# Patient Record
Sex: FEMALE | Race: WHITE
[De-identification: names, ages, dates, MRNs, and addresses within clinical notes are randomized per-mention and may not be internally consistent; named-entity substitution may affect disease eponyms.]

---

## 2018-03-05 ENCOUNTER — HOSPITAL ENCOUNTER (EMERGENCY)
Dept: HOSPITAL 11 - JP.ED | Age: 83
Discharge: ANOTHER HOSPITAL | End: 2018-03-05
Payer: MEDICARE

## 2018-03-05 DIAGNOSIS — Z88.8: ICD-10-CM

## 2018-03-05 DIAGNOSIS — E78.00: ICD-10-CM

## 2018-03-05 DIAGNOSIS — Z79.899: ICD-10-CM

## 2018-03-05 DIAGNOSIS — Z79.82: ICD-10-CM

## 2018-03-05 DIAGNOSIS — K52.9: Primary | ICD-10-CM

## 2018-03-05 DIAGNOSIS — K62.5: ICD-10-CM

## 2018-03-05 PROCEDURE — 36415 COLL VENOUS BLD VENIPUNCTURE: CPT

## 2018-03-05 PROCEDURE — 80048 BASIC METABOLIC PNL TOTAL CA: CPT

## 2018-03-05 PROCEDURE — 85027 COMPLETE CBC AUTOMATED: CPT

## 2018-03-05 PROCEDURE — 99285 EMERGENCY DEPT VISIT HI MDM: CPT

## 2018-03-05 NOTE — EDM.PDOC
ED HPI GENERAL MEDICAL PROBLEM





- General


Chief Complaint: Gastrointestinal Problem


Stated Complaint: MEDICAL VIA NORTH


Time Seen by Provider: 03/05/18 19:27


Source of Information: Reports: Patient, Family (Daughter), Old Records, RN 

Notes Reviewed


History Limitations: Reports: No Limitations





- History of Present Illness


INITIAL COMMENTS - FREE TEXT/NARRATIVE: 





EMS arrival





Chief complaint


Diarrhea, weakness





History of present illness


89-year-old female who lives on her own apartment, talks to her daughter daily


Was well earlier today but at 2 PM started developing diarrhea,


no abdominal pain no nausea no vomiting no cramping


several episodes then about 5 PM she went to lay down, but felt so well she 

went back to the bathroom, incontinent of stool, and then continue to pass a 

large amount of diarrhea in the toilet. Towards the end there was some bright 

red bleeding with some clots.


She went to stand up and felt very weak and then that herself down to the 

floor. When she laid down she actually bumped her personal alarm, EMS was 

notified as was feels a bit better now, not quite as weak.


No abdominal pain


No fever


No sick contacts like this





History of ulcerative colitis and has had some bleeding in the remote past.


She stopped her ulcerative clivus medication a year ago with insurance no 

longer covered it. Her last colonoscopy was 6 years ago and she was told she 

didn't need to have it anymore.





No history of peptic ulcer


Does not use any NSAIDs apart from 81 mg of aspirin daily


  ** denies


Pain Score (Numeric/FACES): 0





- Related Data


 Allergies











Allergy/AdvReac Type Severity Reaction Status Date / Time


 


niacin Allergy  Rash Verified 03/05/18 18:42


 


pravastatin Allergy  Leg Cramps Verified 03/05/18 18:42


 


simvastatin Allergy  Leg Cramps Verified 03/05/18 18:42











Home Meds: 


 Home Meds





Aspirin 81 mg PO DAILY 12/07/13 [History]


Calcium Carbonate/Vitamin D3 [Calcium 600 + Vit D Tablet] 1 tab PO DAILY 12/07/ 13 [History]


Carvedilol 3.125 mg PO DAILY 12/07/13 [History]


Glucosamine/D3/Boswellia Janice [Glucosamine Complex Tablet] 1 tab PO DAILY 12/07 /13 [History]


Multivitamin [Multivitamins] 1 tab PO DAILY 12/07/13 [History]


Omega-3/DHA/Epa/Fish Oil [Fish Oil 1,000 mg Softgel] 1 cap PO DAILY 12/07/13 [

History]


Omeprazole 40 mg PO DAILY PRN 12/07/13 [History]


Acyclovir [Zovirax] 1 tab PO BID 03/05/18 [History]


Brinzolamide/Brimonidine Tart [Simbrinza 1%-0.2% Eye Drops] 1 drop TOP BEDTIME 

03/05/18 [History]


Travoprost [Travatan Z] 1 drop TOP DAILY 03/05/18 [History]


prednisoLONE Acetate [Prednisolone Acetate] 1 drop TOP DAILY 03/05/18 [History]











Past Medical History


HEENT History: Reports: Glaucoma, Impaired Vision


Cardiovascular History: Reports: High Cholesterol, Stents


Gastrointestinal History: Reports: Other (See Below)


Other Gastrointestinal History: hx of ulcerative cholitis


OB/GYN History: Reports: Pregnancy


Musculoskeletal History: Reports: Fracture, Osteoarthritis


Other Musculoskeletal History: r ankle fx


Hematologic History: Reports: Blood Transfusion(s)


Oncologic (Cancer) History: Reports: Other (See Below)


Other Oncologic History: skin





- Infectious Disease History


Infectious Disease History: Reports: Chicken Pox, Measles, Mumps, Other (See 

Below)





- Past Surgical History


HEENT Surgical History: Reports: Cataract Surgery


GI Surgical History: Reports: Colonoscopy


Dermatological Surgical History: Reports: Skin Biopsy, Other (See Below)





Social & Family History





- Tobacco Use


Smoking Status *Q: Never Smoker


Second Hand Smoke Exposure: No





- Caffeine Use


Caffeine Use: Reports: Coffee, Soda, Tea





- Alcohol Use


Days Per Week of Alcohol Use: 0


Number of Drinks Per Day: 1


Total Drinks Per Week: 0





- Recreational Drug Use


Recreational Drug Use: No





ED ROS GENERAL





- Review of Systems


Review Of Systems: See Below


Constitutional: Reports: Malaise, Weakness.  Denies: Fever, Diaphoresis, 

Decreased Appetite


HEENT: Reports: No Symptoms


Respiratory: Reports: No Symptoms


Cardiovascular: Reports: No Symptoms


Endocrine: Reports: No Symptoms


GI/Abdominal: Reports: Bloody Stool, Diarrhea.  Denies: Abdominal Pain, Black 

Stool, Constipation, Hematemesis, Melena, Nausea, Vomiting


: Reports: No Symptoms


Musculoskeletal: Reports: No Symptoms


Skin: Reports: Other (Recent treatment for skin lesions on her face)


Neurological: Reports: Weakness (Lightheaded at home)


Psychiatric: Reports: No Symptoms


Immunologic: Reports: No Symptoms





ED EXAM, GI/ABD





- Physical Exam


Exam: See Below


Exam Limited By: No Limitations


General Appearance: Alert, Mild Distress, Other (Looks well, elevated blood 

pressure other vital signs normal, no difficulty speaking or breathing)


Eyes: Bilateral: Normal Appearance, EOMI


Ears: Normal External Exam, Hearing Grossly Normal


Nose: Normal Inspection


Throat/Mouth: Normal Voice, Other (Dry mouth and tongue)


Head: Atraumatic


Neck: Normal Inspection, Supple.  No: Lymphadenopathy (R), Lymphadenopathy (L)


Respiratory/Chest: No Respiratory Distress, Lungs Clear, No Accessory Muscle Use


Cardiovascular: Normal Peripheral Pulses, Regular Rate, Rhythm


GI/Abdominal Exam: Normal Bowel Sounds, Soft, Non-Tender.  No: Guarding, Rigid, 

Tender


Rectal (Female) Exam: Other (2 small external skin tags, one small internal 

fissure right at the anal margin, no active bleeding currently)


Extremities: Normal Inspection, No Pedal Edema


Neurological: Alert, Oriented, Normal Cognition, No Motor/Sensory Deficits


Psychiatric: Normal Mood


Skin Exam: Warm, Dry, Intact, Normal Color, Other (2 abraded areas on her face 

from recent lesion treatment)





Course





- Vital Signs


Last Recorded V/S: 


 Last Vital Signs











Temp  36.3 C   03/05/18 18:42


 


Pulse  77   03/05/18 21:16


 


Resp  16   03/05/18 18:42


 


BP  153/83 H  03/05/18 21:16


 


Pulse Ox  94 L  03/05/18 21:16














- Orders/Labs/Meds


Orders: 


 Active Orders 24 hr











 Category Date Time Status


 


 Peripheral IV Care [RC] .AS DIRECTED Care  03/05/18 19:28 Active


 


 Sodium Chloride 0.9% [Normal Saline] 1,000 ml Med  03/05/18 19:30 Active





 IV ASDIRECTED   


 


 Peripheral IV Insertion Adult [OM.PC] Routine Oth  03/05/18 19:28 Ordered








 Medication Orders





Sodium Chloride (Normal Saline)  1,000 mls @ 500 mls/hr IV ASDIRECTED [facility]


   Last Admin: 03/05/18 19:45  Dose: 500 mls/hr








Labs: 


 Laboratory Tests











  03/05/18 03/05/18 Range/Units





  19:40 19:40 


 


WBC  8.9   (4.5-11.0)  K/uL


 


RBC  3.86   (3.30-5.50)  M/uL


 


Hgb  12.3   (12.0-15.0)  g/dL


 


Hct  37.5   (36.0-48.0)  %


 


MCV  97   (80-98)  fL


 


MCH  32 H   (27-31)  pg


 


MCHC  33   (32-36)  %


 


Plt Count  262   (150-400)  K/uL


 


Sodium   142  (140-148)  mmol/L


 


Potassium   5.2  (3.6-5.2)  mmol/L


 


Chloride   107  (100-108)  mmol/L


 


Carbon Dioxide   26  (21-32)  mmol/L


 


Anion Gap   9.4  (5.0-14.0)  mmol/L


 


BUN   19 H  (7-18)  mg/dL


 


Creatinine   0.8  (0.6-1.0)  mg/dL


 


Est Cr Clr Drug Dosing   37.71  mL/min


 


Estimated GFR (MDRD)   > 60  (>60)  


 


Glucose   151 H  ()  mg/dL


 


Calcium   8.6  (8.5-10.1)  mg/dL











Meds: 


Medications











Generic Name Dose Route Start Last Admin





  Trade Name Freq  PRN Reason Stop Dose Admin


 


Sodium Chloride  1,000 mls @ 500 mls/hr  03/05/18 19:30  03/05/18 19:45





  Normal Saline  IV   500 mls/hr





  ASDIRECTED [facility]   Administration














- Re-Assessments/Exams


Free Text/Narrative Re-Assessment/Exam: 





03/05/18 19:34


89-year-old female with several episodes of diarrhea becoming more intense 

after 5 PM, associated with some lightheadedness and weakness which has 

improved.


No abdominal pain.


Some rectal bleeding which clotted.





History of ulcerative colitis





Most likely gastroenteritis with bleeding coming from either internal 

hemorrhoids or local proctitis. Unlikely to be due to ulcerative colitis.





Intravenous saline and labs ordered


Not in any abdominal pain or nausea so no medications required at this time


03/05/18 20:45





Feeling well


CBC BMP essentially normal apart from mild elevation BUN


No diarrhea since arrival


03/05/18 21:37





Remained stable, has received a liter of IV saline, stable for discharge home





Departure





- Departure


Time of Disposition: 21:37


Disposition: Admitted As Inpatient 66


Preliminary Cause of Death *Q: Cardiac Arrest


Condition: Good


Clinical Impression: 


 Acute gastroenteritis, Dehydration, moderate, Gastroenteritis, Rectal bleeding








- Discharge Information


Instructions:  Viral Gastroenteritis, Adult, Easy-to-Read, Gastrointestinal 

Bleeding


Referrals: 


Clement Horowitz MD [Primary Care Provider] - 


Forms:  ED Department Discharge


Additional Instructions: 


Drink plenty of fluids


Started eating again when you feel that you are able





You had a small amount of rectal bleeding


Most likely this is due to a small hemorrhoid internally.


Get rechecked if the bleeding is recurring, you may need to have treatment of 

the hemorrhoid if it's persistent





Return to emergency if you have weakness, collapse, fainting, high fever, bad 

abdominal pain or other worsening symptoms





- My Orders


Last 24 Hours: 


My Active Orders





03/05/18 19:28


Peripheral IV Care [RC] .AS DIRECTED 


Peripheral IV Insertion Adult [OM.PC] Routine 





03/05/18 19:30


Sodium Chloride 0.9% [Normal Saline] 1,000 ml IV ASDIRECTED 














- Assessment/Plan


Last 24 Hours: 


My Active Orders





03/05/18 19:28


Peripheral IV Care [RC] .AS DIRECTED 


Peripheral IV Insertion Adult [OM.PC] Routine 





03/05/18 19:30


Sodium Chloride 0.9% [Normal Saline] 1,000 ml IV ASDIRECTED

## 2018-03-11 ENCOUNTER — HOSPITAL ENCOUNTER (EMERGENCY)
Dept: HOSPITAL 11 - JP.ED | Age: 83
Discharge: HOME | End: 2018-03-11
Payer: MEDICARE

## 2018-03-11 DIAGNOSIS — D64.9: ICD-10-CM

## 2018-03-11 DIAGNOSIS — Z79.899: ICD-10-CM

## 2018-03-11 DIAGNOSIS — E78.00: ICD-10-CM

## 2018-03-11 DIAGNOSIS — Z79.82: ICD-10-CM

## 2018-03-11 DIAGNOSIS — R19.7: Primary | ICD-10-CM

## 2018-03-11 DIAGNOSIS — Z88.8: ICD-10-CM

## 2018-03-11 NOTE — EDM.PDOC
ED HPI GENERAL MEDICAL PROBLEM





- General


Chief Complaint: Gastrointestinal Problem


Stated Complaint: loose stools


Time Seen by Provider: 03/11/18 09:15


Source of Information: Reports: Patient, Old Records


History Limitations: Reports: No Limitations





- History of Present Illness


INITIAL COMMENTS - FREE TEXT/NARRATIVE: 





89 yo female was seen here by Dr. Hoover this past Monday. Since then she has 

had waxing and waning diarrhea, not as bad as last Monday. She is visually 

impaired and lives alone so can't see if she has blood in her stool or not. She 

has a pHx of ulcerative colitis so today decides she should come to the ER for 

a hemoccult test to help decide if this diarrhea is from her UC. She has not 

had pain, fever or nausea/vomiting. Feels weak. Has not followed up in the 

clinic. 


Onset Date: 03/05/18


Duration: Day(s):, Waxing/Waning


Location: Reports: Abdomen


Severity: Moderate (diarrhea has been mild to moderate since this past Monday.)


Improves with: Reports: None


Worsens with: Reports: None


Context: Reports: Other (Hx of ulcerative colitis)


Associated Symptoms: Reports: Weakness.  Denies: Fever/Chills, Nausea/Vomiting


Treatments PTA: Reports: Other (see below) (none)


  ** abd cramping


Pain Score (Numeric/FACES): 2





- Related Data


 Allergies











Allergy/AdvReac Type Severity Reaction Status Date / Time


 


niacin Allergy  Rash Verified 03/11/18 08:59


 


pravastatin Allergy  Leg Cramps Verified 03/11/18 08:59


 


simvastatin Allergy  Leg Cramps Verified 03/11/18 08:59











Home Meds: 


 Home Meds





Aspirin 81 mg PO DAILY 12/07/13 [History]


Calcium Carbonate/Vitamin D3 [Calcium 600 + Vit D Tablet] 1 tab PO DAILY 12/07/ 13 [History]


Carvedilol 3.125 mg PO DAILY 12/07/13 [History]


Glucosamine/D3/Boswellia Janice [Glucosamine Complex Tablet] 1 tab PO DAILY 12/07 /13 [History]


Multivitamin [Multivitamins] 1 tab PO DAILY 12/07/13 [History]


Omega-3/DHA/Epa/Fish Oil [Fish Oil 1,000 mg Softgel] 1 cap PO DAILY 12/07/13 [

History]


Omeprazole 40 mg PO DAILY PRN 12/07/13 [History]


Acyclovir [Zovirax] 1 tab PO BID 03/05/18 [History]


Brinzolamide/Brimonidine Tart [Simbrinza 1%-0.2% Eye Drops] 1 drop TOP BEDTIME 

03/05/18 [History]


Travoprost [Travatan Z] 1 drop TOP DAILY 03/05/18 [History]


prednisoLONE Acetate [Prednisolone Acetate] 1 drop TOP DAILY 03/05/18 [History]











Past Medical History


HEENT History: Reports: Glaucoma, Impaired Vision


Cardiovascular History: Reports: High Cholesterol, Stents


Gastrointestinal History: Reports: Other (See Below)


Other Gastrointestinal History: hx of ulcerative cholitis


OB/GYN History: Reports: Pregnancy


Musculoskeletal History: Reports: Fracture, Osteoarthritis


Other Musculoskeletal History: r ankle fx


Hematologic History: Reports: Blood Transfusion(s)


Oncologic (Cancer) History: Reports: Other (See Below)


Other Oncologic History: skin





- Infectious Disease History


Infectious Disease History: Reports: Chicken Pox, Measles, Mumps, Other (See 

Below)





- Past Surgical History


HEENT Surgical History: Reports: Cataract Surgery


GI Surgical History: Reports: Colonoscopy


Dermatological Surgical History: Reports: Skin Biopsy, Other (See Below)





Social & Family History





- Tobacco Use


Smoking Status *Q: Never Smoker


Second Hand Smoke Exposure: No





- Caffeine Use


Caffeine Use: Reports: Coffee





- Alcohol Use


Days Per Week of Alcohol Use: 2


Number of Drinks Per Day: 1


Total Drinks Per Week: 2





- Recreational Drug Use


Recreational Drug Use: No





ED ROS GENERAL





- Review of Systems


Review Of Systems: See Below


Constitutional: Reports: Weakness


HEENT: Reports: No Symptoms


Respiratory: Reports: No Symptoms


Cardiovascular: Reports: No Symptoms


GI/Abdominal: Reports: Diarrhea.  Denies: Black Stool, Bloody Stool, 

Constipation, Hematemesis, Hematochezia, Melena, Nausea, Vomiting


: Reports: No Symptoms


Musculoskeletal: Reports: No Symptoms


Skin: Reports: No Symptoms


Neurological: Reports: No Symptoms





ED EXAM, GI/ABD





- Physical Exam


Exam: See Below


Exam Limited By: No Limitations


General Appearance: Alert, WD/WN, No Apparent Distress


Eyes: Bilateral: Normal Appearance


Ears: Normal External Exam, Normal Canal, Hearing Grossly Normal, Normal TMs


Nose: Normal Inspection, Normal Mucosa, No Blood


Throat/Mouth: Normal Inspection, Normal Lips, Normal Oropharynx, Normal Voice, 

No Airway Compromise


Head: Atraumatic, Normocephalic


Neck: Normal Inspection, Supple


Respiratory/Chest: No Respiratory Distress, Lungs Clear, Normal Breath Sounds, 

No Accessory Muscle Use


Cardiovascular: Regular Rate, Rhythm, No Edema


GI/Abdominal Exam: Normal Bowel Sounds, Soft, Non-Tender, No Distention, Other (

unremarkable rectal exam with tan, soft stool noted on glove.)


Back Exam: Normal Inspection.  No: CVA Tenderness (R), CVA Tenderness (L)


Extremities: Normal Inspection, Normal Range of Motion, Non-Tender, No Pedal 

Edema


Neurological: Alert, Oriented, CN II-XII Intact, Normal Cognition


Psychiatric: Normal Affect, Normal Mood


Skin Exam: Warm, Dry, Intact, Normal Color, No Rash





Course





- Vital Signs


Last Recorded V/S: 


 Last Vital Signs











Temp  36.6 C   03/11/18 09:04


 


Pulse  82   03/11/18 09:04


 


Resp  18   03/11/18 09:04


 


BP  124/64   03/11/18 09:04


 


Pulse Ox  98   03/11/18 09:04








 





Orthostatic Blood Pressure [     108/58


Standing]                        


Orthostatic Blood Pressure [     104/62


Sitting]                         


Orthostatic Blood Pressure [     110/68


Supine]                          











- Orders/Labs/Meds


Orders: 


 Active Orders 24 hr











 Category Date Time Status


 


 Orthostatic Vital Signs [RC] ASDIRECTED Care  03/11/18 08:51 Active











Labs: 


 Laboratory Tests











  03/11/18 03/11/18 Range/Units





  08:51 09:03 


 


WBC  5.2   (4.5-11.0)  K/uL


 


RBC  3.20 L   (3.30-5.50)  M/uL


 


Hgb  10.6 L   (12.0-15.0)  g/dL


 


Hct  31.1 L   (36.0-48.0)  %


 


MCV  97   (80-98)  fL


 


MCH  33 H   (27-31)  pg


 


MCHC  34   (32-36)  %


 


Plt Count  273   (150-400)  K/uL


 


Sodium   140  (140-148)  mmol/L


 


Potassium   4.2  (3.6-5.2)  mmol/L


 


Chloride   107  (100-108)  mmol/L


 


Carbon Dioxide   23  (21-32)  mmol/L


 


Anion Gap   9.9  (5.0-14.0)  mmol/L


 


BUN   12  (7-18)  mg/dL


 


Creatinine   0.7  (0.6-1.0)  mg/dL


 


Est Cr Clr Drug Dosing   TNP  


 


Estimated GFR (MDRD)   > 60  (>60)  


 


Glucose   123 H  ()  mg/dL


 


Calcium   8.3 L  (8.5-10.1)  mg/dL


 


C-Reactive Protein   1.51 H  (0.0-0.3)  mg/dL














Departure





- Departure


Time of Disposition: 09:35


Disposition: Home, Self-Care 01


Condition: Good


Clinical Impression: 


 Diarrhea, Mild anemia








- Discharge Information


Referrals: 


Clement Horowitz MD [Primary Care Provider] - 


Forms:  ED Department Discharge





- My Orders


Last 24 Hours: 


My Active Orders





03/11/18 08:51


Orthostatic Vital Signs [RC] ASDIRECTED 














- Assessment/Plan


Last 24 Hours: 


My Active Orders





03/11/18 08:51


Orthostatic Vital Signs [RC] ASDIRECTED

## 2018-08-09 ENCOUNTER — HOSPITAL ENCOUNTER (EMERGENCY)
Dept: HOSPITAL 11 - JP.ED | Age: 83
Discharge: HOME | End: 2018-08-09
Payer: MEDICARE

## 2018-08-09 DIAGNOSIS — Z88.8: ICD-10-CM

## 2018-08-09 DIAGNOSIS — R55: Primary | ICD-10-CM

## 2018-08-09 DIAGNOSIS — Z79.82: ICD-10-CM

## 2018-08-09 DIAGNOSIS — D64.9: ICD-10-CM

## 2018-08-09 PROCEDURE — 70450 CT HEAD/BRAIN W/O DYE: CPT

## 2018-08-09 PROCEDURE — 36415 COLL VENOUS BLD VENIPUNCTURE: CPT

## 2018-08-09 PROCEDURE — 71045 X-RAY EXAM CHEST 1 VIEW: CPT

## 2018-08-09 PROCEDURE — 93225 XTRNL ECG REC<48 HRS REC: CPT

## 2018-08-09 PROCEDURE — 80053 COMPREHEN METABOLIC PANEL: CPT

## 2018-08-09 PROCEDURE — 96360 HYDRATION IV INFUSION INIT: CPT

## 2018-08-09 PROCEDURE — 81001 URINALYSIS AUTO W/SCOPE: CPT

## 2018-08-09 PROCEDURE — 99285 EMERGENCY DEPT VISIT HI MDM: CPT

## 2018-08-09 PROCEDURE — 85025 COMPLETE CBC W/AUTO DIFF WBC: CPT

## 2018-08-09 PROCEDURE — 93226 XTRNL ECG REC<48 HR SCAN A/R: CPT

## 2018-08-09 PROCEDURE — 96361 HYDRATE IV INFUSION ADD-ON: CPT

## 2018-08-09 PROCEDURE — 84484 ASSAY OF TROPONIN QUANT: CPT

## 2018-08-09 PROCEDURE — 93005 ELECTROCARDIOGRAM TRACING: CPT

## 2018-08-09 NOTE — EDM.PDOC
ED HPI GENERAL MEDICAL PROBLEM





- General


Chief Complaint: Syncope


Stated Complaint: MEDICAL VIA NORTH


Time Seen by Provider: 08/09/18 09:50


Source of Information: Reports: Patient


History Limitations: Reports: No Limitations





- History of Present Illness


INITIAL COMMENTS - FREE TEXT/NARRATIVE: 





[place] [facility] [employer] and she was having her hair done. The pt passed out 

and she was out for about 7 minutes. She was responsive when the ambulance 

arrived. She did not have dizziness and she did not have chest pain. 


Onset: Today, Sudden


Duration: Hour(s):


Location: Reports: Head, Other (pt passed out and was unresponsive. )


Associated Symptoms: Reports: Syncope


Treatments PTA: Reports: EKG





- Related Data


 Allergies











Allergy/AdvReac Type Severity Reaction Status Date / Time


 


niacin Allergy  Rash Verified 08/09/18 09:15


 


pravastatin Allergy  Leg Cramps Verified 08/09/18 09:15


 


simvastatin Allergy  Leg Cramps Verified 08/09/18 09:15











Home Meds: 


 Home Meds





Aspirin 81 mg PO DAILY 12/07/13 [History]


Calcium Carbonate/Vitamin D3 [Calcium 600 + Vit D Tablet] 1 tab PO DAILY 12/07/ 13 [History]


Carvedilol 3.125 mg PO DAILY 12/07/13 [History]


Glucosamine/D3/Boswellia Janice [Glucosamine Complex Tablet] 1 tab PO DAILY 12/07 /13 [History]


Multivitamin [Multivitamins] 1 tab PO DAILY 12/07/13 [History]


Omeprazole 40 mg PO DAILY PRN 12/07/13 [History]


Acyclovir [Zovirax] 1 tab PO BID 03/05/18 [History]


Brinzolamide/Brimonidine Tart [Simbrinza 1%-0.2% Eye Drops] 1 drop TOP BEDTIME 

03/05/18 [History]


Travoprost [Travatan Z] 1 drop TOP DAILY 03/05/18 [History]


prednisoLONE Acetate [Prednisolone Acetate] 1 drop TOP DAILY 03/05/18 [History]











Past Medical History


HEENT History: Reports: Glaucoma, Impaired Vision


Cardiovascular History: Reports: High Cholesterol, Stents


Gastrointestinal History: Reports: Other (See Below)


Other Gastrointestinal History: hx of ulcerative cholitis


OB/GYN History: Reports: Pregnancy


Musculoskeletal History: Reports: Fracture, Osteoarthritis


Other Musculoskeletal History: r ankle fx


Hematologic History: Reports: Blood Transfusion(s)


Oncologic (Cancer) History: Reports: Other (See Below)


Other Oncologic History: skin


Dermatologic History: Reports: None





- Infectious Disease History


Infectious Disease History: Reports: Chicken Pox, Measles, Mumps, Pertussis (

Whooping Cough)





- Past Surgical History


Head Surgeries/Procedures: Reports: None


HEENT Surgical History: Reports: Cataract Surgery


Cardiovascular Surgical History: Reports: None


GI Surgical History: Reports: Colonoscopy


Musculoskeletal Surgical History: Reports: None


Oncologic Surgical History: Reports: None


Dermatological Surgical History: Reports: Skin Biopsy, Other (See Below)





Social & Family History





- Family History


Family Medical History: Noncontributory





- Tobacco Use


Smoking Status *Q: Never Smoker


Second Hand Smoke Exposure: No





- Caffeine Use


Caffeine Use: Reports: Coffee





- Recreational Drug Use


Recreational Drug Use: No





ED ROS GENERAL





- Review of Systems


Review Of Systems: See Below


Constitutional: Reports: No Symptoms


HEENT: Reports: No Symptoms


Respiratory: Reports: No Symptoms


Cardiovascular: Reports: Syncope


Endocrine: Reports: No Symptoms


GI/Abdominal: Reports: No Symptoms


: Reports: No Symptoms


Musculoskeletal: Reports: No Symptoms


Skin: Reports: No Symptoms


Neurological: Reports: No Symptoms


Psychiatric: Reports: No Symptoms





ED EXAM, GENERAL





- Physical Exam


Exam: See Below


Free Text/Narrative:: 





pt arrived looking quite pale. She passed out while she was getting her hair 

done today,  She did not feel dizzy or like she was going to pass out  earlier, 

She did not have chest pain. 


Exam Limited By: No Limitations


General Appearance: Alert, Mild Distress, Other ( Pt she was stood up to do 

othostatiocs she got lite headed. She was given  a liter of fluids and feels 

much better.  pupils are equal and reactive. )


Ears: Normal TMs


Ear Exam: Right Ear: Discharge


Nose: Normal Inspection


Throat/Mouth: Normal Inspection


Head: Atraumatic


Neck: Normal Inspection


Respiratory/Chest: No Respiratory Distress


Cardiovascular: Regular Rate, Rhythm


GI/Abdominal: Soft, Non-Tender


 (Female) Exam: Deferred


Rectal (Female) Exam: Deferred


Back Exam: Normal Inspection


Extremities: Normal Inspection


Neurological: Alert, Oriented, Normal Cognition


Psychiatric: Normal Affect





Course





- Vital Signs


Last Recorded V/S: 


 Last Vital Signs











Temp  35.4 C   08/09/18 09:30


 


Pulse  64   08/09/18 09:30


 


Resp  21 H  08/09/18 09:30


 


BP  156/79 H  08/09/18 09:30


 


Pulse Ox  91 L  08/09/18 09:30








 





Orthostatic Blood Pressure [     120/73


Standing]                        


Orthostatic Blood Pressure [     136/74


Sitting]                         


Orthostatic Blood Pressure [     133/66


Supine]                          











- Orders/Labs/Meds


Orders: 


 Active Orders 24 hr











 Category Date Time Status


 


 EKG Documentation Completion [RC] ASDIRECTED Care  08/09/18 09:31 Active


 


 Orthostatic Vital Signs [RC] ASDIRECTED Care  08/09/18 10:23 Active


 


 UA W/MICROSCOPIC [URIN] Urgent Lab  08/09/18 10:13 Ordered


 


 Sodium Chloride 0.9% [Normal Saline] 1,000 ml Med  08/09/18 11:00 Active





 IV ASDIRECTED   


 


 EKG 12 Lead [EK] Routine Ther  08/09/18 09:31 Ordered








 Medication Orders





Sodium Chloride (Normal Saline)  1,000 mls @ 500 mls/hr IV ASDIRECTED BLANK


   Last Admin: 08/09/18 11:13  Dose: 500 mls/hr








Labs: 


 Laboratory Tests











  08/09/18 08/09/18 08/09/18 Range/Units





  09:30 09:30 09:30 


 


WBC   7.4   (4.5-11.0)  K/uL


 


RBC   4.67   (3.30-5.50)  M/uL


 


Hgb   11.7 L   (12.0-15.0)  g/dL


 


Hct   37.1   (36.0-48.0)  %


 


MCV   79 L   (80-98)  fL


 


MCH   25 L   (27-31)  pg


 


MCHC   32   (32-36)  %


 


Plt Count   282   (150-400)  K/uL


 


Neut % (Auto)   63   (36-66)  %


 


Lymph % (Auto)   21 L   (24-44)  %


 


Mono % (Auto)   14 H   (2-6)  %


 


Eos % (Auto)   2   (2-4)  %


 


Baso % (Auto)   0   (0-1)  %


 


Sodium    137 L  (140-148)  mmol/L


 


Potassium    4.2  (3.6-5.2)  mmol/L


 


Chloride    103  (100-108)  mmol/L


 


Carbon Dioxide    26  (21-32)  mmol/L


 


Anion Gap    12.2  (5.0-14.0)  mmol/L


 


BUN    22 H D  (7-18)  mg/dL


 


Creatinine    0.8  (0.6-1.0)  mg/dL


 


Est Cr Clr Drug Dosing    34.24  mL/min


 


Estimated GFR (MDRD)    > 60  (>60)  


 


Glucose    138 H  ()  mg/dL


 


Calcium    8.8  (8.5-10.1)  mg/dL


 


Total Bilirubin    0.6  (0.2-1.0)  mg/dL


 


AST    17  (15-37)  U/L


 


ALT    18  (12-78)  U/L


 


Alkaline Phosphatase    88  ()  U/L


 


Troponin I  < 0.017    (0.000-0.056)  ng/mL


 


Total Protein    6.5  (6.4-8.2)  g/dL


 


Albumin    3.2 L  (3.4-5.0)  g/dL


 


Globulin    3.3  (2.3-3.5)  g/dL


 


Albumin/Globulin Ratio    1.0 L  (1.2-2.2)  


 


Urine Color     


 


Urine Appearance     


 


Urine pH     (4.5-8.0)  


 


Ur Specific Gravity     (1.008-1.030)  


 


Urine Protein     (NEGATIVE)  mg/dL


 


Urine Glucose (UA)     (NEGATIVE)  mg/dL


 


Urine Ketones     (NEGATIVE)  mg/dL


 


Urine Occult Blood     (NEGATIVE)  


 


Urine Nitrite     (NEGATIVE)  


 


Urine Bilirubin     (NEGATIVE)  


 


Urine Urobilinogen     (NORMAL)  mg/dL


 


Ur Leukocyte Esterase     (NEGATIVE)  


 


Urine RBC     (0-5)  


 


Urine WBC     (0-5)  


 


Ur Epithelial Cells     


 


Amorphous Sediment     


 


Urine Bacteria     


 


Urine Mucus     














  08/09/18 Range/Units





  10:13 


 


WBC   (4.5-11.0)  K/uL


 


RBC   (3.30-5.50)  M/uL


 


Hgb   (12.0-15.0)  g/dL


 


Hct   (36.0-48.0)  %


 


MCV   (80-98)  fL


 


MCH   (27-31)  pg


 


MCHC   (32-36)  %


 


Plt Count   (150-400)  K/uL


 


Neut % (Auto)   (36-66)  %


 


Lymph % (Auto)   (24-44)  %


 


Mono % (Auto)   (2-6)  %


 


Eos % (Auto)   (2-4)  %


 


Baso % (Auto)   (0-1)  %


 


Sodium   (140-148)  mmol/L


 


Potassium   (3.6-5.2)  mmol/L


 


Chloride   (100-108)  mmol/L


 


Carbon Dioxide   (21-32)  mmol/L


 


Anion Gap   (5.0-14.0)  mmol/L


 


BUN   (7-18)  mg/dL


 


Creatinine   (0.6-1.0)  mg/dL


 


Est Cr Clr Drug Dosing   mL/min


 


Estimated GFR (MDRD)   (>60)  


 


Glucose   ()  mg/dL


 


Calcium   (8.5-10.1)  mg/dL


 


Total Bilirubin   (0.2-1.0)  mg/dL


 


AST   (15-37)  U/L


 


ALT   (12-78)  U/L


 


Alkaline Phosphatase   ()  U/L


 


Troponin I   (0.000-0.056)  ng/mL


 


Total Protein   (6.4-8.2)  g/dL


 


Albumin   (3.4-5.0)  g/dL


 


Globulin   (2.3-3.5)  g/dL


 


Albumin/Globulin Ratio   (1.2-2.2)  


 


Urine Color  Yellow  


 


Urine Appearance  Clear  


 


Urine pH  6.5  (4.5-8.0)  


 


Ur Specific Gravity  1.015  (1.008-1.030)  


 


Urine Protein  Negative  (NEGATIVE)  mg/dL


 


Urine Glucose (UA)  Normal  (NEGATIVE)  mg/dL


 


Urine Ketones  Negative  (NEGATIVE)  mg/dL


 


Urine Occult Blood  Negative  (NEGATIVE)  


 


Urine Nitrite  Negative  (NEGATIVE)  


 


Urine Bilirubin  Negative  (NEGATIVE)  


 


Urine Urobilinogen  1  (NORMAL)  mg/dL


 


Ur Leukocyte Esterase  Negative  (NEGATIVE)  


 


Urine RBC  0-5  (0-5)  


 


Urine WBC  0-5  (0-5)  


 


Ur Epithelial Cells  Few  


 


Amorphous Sediment  Few  


 


Urine Bacteria  Not seen  


 


Urine Mucus  Not seen  











Meds: 


Medications











Generic Name Dose Route Start Last Admin





  Trade Name Freq  PRN Reason Stop Dose Admin


 


Sodium Chloride  1,000 mls @ 500 mls/hr  08/09/18 11:00  08/09/18 11:13





  Normal Saline  IV   500 mls/hr





  ASDIRECTED [facility]   Administration





     





     





     





     














- Re-Assessments/Exams


Free Text/Narrative Re-Assessment/Exam: 





08/09/18 13:22


pt had a cat scan of the head which did not show acute changes, her lab work 

looked good. She had a mild anemia at 11.3. She had normal cardiac enzymes.  

She is ambulating well at this point. 





Departure





- Departure


Time of Disposition: 13:23


Disposition: Home, Self-Care 01


Condition: Fair


Clinical Impression: 


 Syncope, Anemia





Referrals: 


PCP,None [Primary Care Provider] - 


Forms:  ED Department Discharge


Care Plan Goals: 


push fluids, holter monitor 48 hour, rtc if problems. 





- My Orders


Last 24 Hours: 


My Active Orders





08/09/18 09:31


EKG Documentation Completion [RC] ASDIRECTED 


EKG 12 Lead [EK] Routine 





08/09/18 10:13


UA W/MICROSCOPIC [URIN] Urgent 





08/09/18 10:23


Orthostatic Vital Signs [RC] ASDIRECTED 





08/09/18 11:00


Sodium Chloride 0.9% [Normal Saline] 1,000 ml IV ASDIRECTED 














- Assessment/Plan


Last 24 Hours: 


My Active Orders





08/09/18 09:31


EKG Documentation Completion [RC] ASDIRECTED 


EKG 12 Lead [EK] Routine 





08/09/18 10:13


UA W/MICROSCOPIC [URIN] Urgent 





08/09/18 10:23


Orthostatic Vital Signs [RC] ASDIRECTED 





08/09/18 11:00


Sodium Chloride 0.9% [Normal Saline] 1,000 ml IV ASDIRECTED

## 2018-08-09 NOTE — CT
Head wo Cont

 

CLINICAL HISTORY: Syncope 

 

COMPARISON: None

 

TECHNIQUE: Transverse scans were obtained from the base of the skull through the vertex without IV co
ntrast on a multislice, multidetector CT scanner. Auto dosage reduction and iterative reconstruction 
techniques employed.

 

FINDINGS: There is a focal area of low-attenuation involving the left basal ganglia. There is no mass
 effect, hemorrhage, or extraaxial collection. The basal cisterns and sulci over the convexities are 
prominent. The ventricles are mildly prominent.

There is some mildly heterogeneous periventricular lucency.

 

 

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 Chronology is uncertain. This could 
be subacute. There is no evidence of hemorrhage

 

Moderate atrophy

 

Chronic ischemic microvascular changes

## 2018-08-09 NOTE — CR
CHEST: Portable

 

CLINICAL HISTORY:Syncope

 

COMPARISON:None

 

FINDINGS:  Heart size and pulmonary vascularity appear normal. There is a large retrocardiac hiatal h
ernia. There are atherosclerotic changes in the aorta. Lung fields are clear.

 

 

IMPRESSION:  Large retrocardiac hiatal hernia

 

No acute cardiopulmonary process